# Patient Record
Sex: FEMALE | Race: BLACK OR AFRICAN AMERICAN | URBAN - METROPOLITAN AREA
[De-identification: names, ages, dates, MRNs, and addresses within clinical notes are randomized per-mention and may not be internally consistent; named-entity substitution may affect disease eponyms.]

---

## 2017-04-27 ENCOUNTER — EMERGENCY (EMERGENCY)
Facility: HOSPITAL | Age: 57
LOS: 1 days | Discharge: HOME | End: 2017-04-27
Admitting: EMERGENCY MEDICINE
Payer: COMMERCIAL

## 2017-04-27 VITALS
RESPIRATION RATE: 18 BRPM | SYSTOLIC BLOOD PRESSURE: 138 MMHG | TEMPERATURE: 98 F | OXYGEN SATURATION: 100 % | HEART RATE: 98 BPM | DIASTOLIC BLOOD PRESSURE: 83 MMHG

## 2017-04-27 LAB
ALBUMIN SERPL ELPH-MCNC: 4.2 G/DL — SIGNIFICANT CHANGE UP (ref 3.3–5)
ALP SERPL-CCNC: 101 U/L — SIGNIFICANT CHANGE UP (ref 40–120)
ALT FLD-CCNC: 21 U/L — SIGNIFICANT CHANGE UP (ref 4–33)
AST SERPL-CCNC: 17 U/L — SIGNIFICANT CHANGE UP (ref 4–32)
BASOPHILS # BLD AUTO: 0 K/UL — SIGNIFICANT CHANGE UP (ref 0–0.2)
BASOPHILS NFR BLD AUTO: 0 % — SIGNIFICANT CHANGE UP (ref 0–2)
BILIRUB SERPL-MCNC: 0.2 MG/DL — SIGNIFICANT CHANGE UP (ref 0.2–1.2)
BUN SERPL-MCNC: 10 MG/DL — SIGNIFICANT CHANGE UP (ref 7–23)
CALCIUM SERPL-MCNC: 9.9 MG/DL — SIGNIFICANT CHANGE UP (ref 8.4–10.5)
CHLORIDE SERPL-SCNC: 104 MMOL/L — SIGNIFICANT CHANGE UP (ref 98–107)
CK MB BLD-MCNC: 0.7 — SIGNIFICANT CHANGE UP (ref 0–2.5)
CK MB BLD-MCNC: 1.21 NG/ML — SIGNIFICANT CHANGE UP (ref 1–4.7)
CK SERPL-CCNC: 173 U/L — HIGH (ref 25–170)
CO2 SERPL-SCNC: 20 MMOL/L — LOW (ref 22–31)
CREAT SERPL-MCNC: 0.79 MG/DL — SIGNIFICANT CHANGE UP (ref 0.5–1.3)
EOSINOPHIL # BLD AUTO: 0.01 K/UL — SIGNIFICANT CHANGE UP (ref 0–0.5)
EOSINOPHIL NFR BLD AUTO: 0.1 % — SIGNIFICANT CHANGE UP (ref 0–6)
GLUCOSE SERPL-MCNC: 139 MG/DL — HIGH (ref 70–99)
HCT VFR BLD CALC: 43.7 % — SIGNIFICANT CHANGE UP (ref 34.5–45)
HGB BLD-MCNC: 14.8 G/DL — SIGNIFICANT CHANGE UP (ref 11.5–15.5)
IMM GRANULOCYTES NFR BLD AUTO: 0.5 % — SIGNIFICANT CHANGE UP (ref 0–1.5)
LYMPHOCYTES # BLD AUTO: 1.56 K/UL — SIGNIFICANT CHANGE UP (ref 1–3.3)
LYMPHOCYTES # BLD AUTO: 13 % — SIGNIFICANT CHANGE UP (ref 13–44)
MCHC RBC-ENTMCNC: 28.5 PG — SIGNIFICANT CHANGE UP (ref 27–34)
MCHC RBC-ENTMCNC: 33.9 % — SIGNIFICANT CHANGE UP (ref 32–36)
MCV RBC AUTO: 84 FL — SIGNIFICANT CHANGE UP (ref 80–100)
MONOCYTES # BLD AUTO: 0.21 K/UL — SIGNIFICANT CHANGE UP (ref 0–0.9)
MONOCYTES NFR BLD AUTO: 1.8 % — LOW (ref 2–14)
NEUTROPHILS # BLD AUTO: 10.12 K/UL — HIGH (ref 1.8–7.4)
NEUTROPHILS NFR BLD AUTO: 84.6 % — HIGH (ref 43–77)
PLATELET # BLD AUTO: 383 K/UL — SIGNIFICANT CHANGE UP (ref 150–400)
PMV BLD: 9.7 FL — SIGNIFICANT CHANGE UP (ref 7–13)
POTASSIUM SERPL-MCNC: 4.6 MMOL/L — SIGNIFICANT CHANGE UP (ref 3.5–5.3)
POTASSIUM SERPL-SCNC: 4.6 MMOL/L — SIGNIFICANT CHANGE UP (ref 3.5–5.3)
PROT SERPL-MCNC: 8 G/DL — SIGNIFICANT CHANGE UP (ref 6–8.3)
RBC # BLD: 5.2 M/UL — SIGNIFICANT CHANGE UP (ref 3.8–5.2)
RBC # FLD: 14.1 % — SIGNIFICANT CHANGE UP (ref 10.3–14.5)
SODIUM SERPL-SCNC: 139 MMOL/L — SIGNIFICANT CHANGE UP (ref 135–145)
TROPONIN T SERPL-MCNC: < 0.06 NG/ML — SIGNIFICANT CHANGE UP (ref 0–0.06)
WBC # BLD: 11.96 K/UL — HIGH (ref 3.8–10.5)
WBC # FLD AUTO: 11.96 K/UL — HIGH (ref 3.8–10.5)

## 2017-04-27 PROCEDURE — 99284 EMERGENCY DEPT VISIT MOD MDM: CPT

## 2017-04-27 PROCEDURE — 71020: CPT | Mod: 26

## 2017-04-27 RX ORDER — ACETAMINOPHEN 500 MG
650 TABLET ORAL ONCE
Qty: 0 | Refills: 0 | Status: COMPLETED | OUTPATIENT
Start: 2017-04-27 | End: 2017-04-27

## 2017-04-27 RX ADMIN — Medication 650 MILLIGRAM(S): at 22:43
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
## 2017-04-27 NOTE — ED PROVIDER NOTE - PROGRESS NOTE DETAILS
AMIE Orozco- Pt feeling better after ER stay, EKG NSR, no st t wave changes, trop neg, cxr neg for acute pathology. stable for dc The scribe's documentation has been prepared under my direction and personally reviewed by me in its entirety. I confirm that the note above accurately reflects all work, treatment, procedures, and medical decision making performed by me. Shaw Orozco PA-C
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
## 2017-04-27 NOTE — ED PROVIDER NOTE - NS ED MD SCRIBE ATTENDING SCRIBE SECTIONS
VITAL SIGNS( Pullset)/DISPOSITION/PHYSICAL EXAM/PAST MEDICAL/SURGICAL/SOCIAL HISTORY/HIV/REVIEW OF SYSTEMS/HISTORY OF PRESENT ILLNESS

## 2017-04-27 NOTE — ED PROVIDER NOTE - CHPI ED SYMPTOMS NEG
no loss of consciousness/no CP, no abd pain, no numbness, no tingling, no weakness, no other complaints
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
## 2017-04-30 ENCOUNTER — APPOINTMENT (OUTPATIENT)
Dept: CT IMAGING | Facility: IMAGING CENTER | Age: 57
End: 2017-04-30

## 2017-04-30 ENCOUNTER — OUTPATIENT (OUTPATIENT)
Dept: OUTPATIENT SERVICES | Facility: HOSPITAL | Age: 57
LOS: 1 days | End: 2017-04-30
Payer: COMMERCIAL

## 2017-04-30 DIAGNOSIS — Z00.8 ENCOUNTER FOR OTHER GENERAL EXAMINATION: ICD-10-CM

## 2017-04-30 PROCEDURE — 74177 CT ABD & PELVIS W/CONTRAST: CPT

## 2017-05-28 ENCOUNTER — EMERGENCY (EMERGENCY)
Facility: HOSPITAL | Age: 57
LOS: 1 days | Discharge: HOME | End: 2017-05-28
Attending: EMERGENCY MEDICINE
Payer: COMMERCIAL

## 2017-05-28 VITALS
TEMPERATURE: 99 F | HEART RATE: 95 BPM | SYSTOLIC BLOOD PRESSURE: 127 MMHG | RESPIRATION RATE: 18 BRPM | WEIGHT: 197.98 LBS | OXYGEN SATURATION: 95 % | HEIGHT: 64 IN | DIASTOLIC BLOOD PRESSURE: 65 MMHG

## 2017-05-28 DIAGNOSIS — J40 BRONCHITIS, NOT SPECIFIED AS ACUTE OR CHRONIC: ICD-10-CM

## 2017-05-28 DIAGNOSIS — K21.9 GASTRO-ESOPHAGEAL REFLUX DISEASE WITHOUT ESOPHAGITIS: ICD-10-CM

## 2017-05-28 DIAGNOSIS — J34.89 OTHER SPECIFIED DISORDERS OF NOSE AND NASAL SINUSES: ICD-10-CM

## 2017-05-28 DIAGNOSIS — R05 COUGH: ICD-10-CM

## 2017-05-28 DIAGNOSIS — R06.00 DYSPNEA, UNSPECIFIED: ICD-10-CM

## 2017-05-28 PROCEDURE — 99284 EMERGENCY DEPT VISIT MOD MDM: CPT | Mod: 25

## 2017-05-28 PROCEDURE — 71020: CPT | Mod: 26

## 2017-05-28 RX ORDER — PANTOPRAZOLE SODIUM 20 MG/1
0 TABLET, DELAYED RELEASE ORAL
Qty: 0 | Refills: 0 | COMMUNITY

## 2017-05-28 NOTE — ED ADULT TRIAGE NOTE - CHIEF COMPLAINT QUOTE
Patient reports "coughing and difficultly breathing." productive cough "white phlegm." + nasal congestion. Patient states symptoms "for 3 weeks but getting worse." Patient reports "coughing and difficultly breathing." productive cough "white phlegm." + nasal congestion. Patient states symptoms "for 3 weeks but getting worse." Patient given Duoneb treatment.

## 2017-05-28 NOTE — ED ADULT NURSE NOTE - OBJECTIVE STATEMENT
pt is AxOx4; c/o "coughing and difficultly breathing." productive cough "white phlegm." + nasal congestion. Patient states symptoms "for 3 weeks but getting worse." pt was seen by ENT 3 weeks ago for sinusitis and were tx with Z-pack. states that all "infection went down" to her chest. c/o  generalized body pain from continuous coughing

## 2017-05-28 NOTE — ED ADULT NURSE NOTE - CHIEF COMPLAINT QUOTE
Patient reports "coughing and difficultly breathing." productive cough "white phlegm." + nasal congestion. Patient states symptoms "for 3 weeks but getting worse."

## 2017-05-29 VITALS
OXYGEN SATURATION: 98 % | HEART RATE: 100 BPM | DIASTOLIC BLOOD PRESSURE: 49 MMHG | TEMPERATURE: 98 F | SYSTOLIC BLOOD PRESSURE: 108 MMHG | RESPIRATION RATE: 18 BRPM

## 2017-05-29 RX ORDER — IPRATROPIUM/ALBUTEROL SULFATE 18-103MCG
3 AEROSOL WITH ADAPTER (GRAM) INHALATION
Qty: 0 | Refills: 0 | Status: COMPLETED | OUTPATIENT
Start: 2017-05-29 | End: 2017-05-29

## 2017-05-29 RX ADMIN — Medication 3 MILLILITER(S): at 00:30

## 2017-05-29 RX ADMIN — Medication 3 MILLILITER(S): at 00:01

## 2017-05-29 RX ADMIN — Medication 100 MILLIGRAM(S): at 00:35

## 2017-05-29 RX ADMIN — Medication 50 MILLIGRAM(S): at 01:43

## 2017-05-29 RX ADMIN — Medication 3 MILLILITER(S): at 00:15

## 2017-05-29 NOTE — ED PROVIDER NOTE - PROGRESS NOTE DETAILS
Pt is improved after breathing treatment, CXR wnl per my read. Pt given steroids, d/c to f/u w pmd. Breathing comfortably, not in discomfort.

## 2017-05-29 NOTE — ED PROVIDER NOTE - OBJECTIVE STATEMENT
Pt is a 55 yo lady with no significant past medical history who presents to the ED with cough for 3 weeks. It started out with rhinorrhea, and sore throat which resolved, but then still had cough. Productive of white sputum, has no fevers, no chest pain, no sob. Has some post sussive emesis. No abdominal pain, no recent travel, no hx of dvt/pe, no pleuritic pain. No sick contacts. Has had bronchitis before, which this feels similar to.

## 2017-06-07 ENCOUNTER — APPOINTMENT (OUTPATIENT)
Dept: PEDIATRIC ALLERGY IMMUNOLOGY | Facility: CLINIC | Age: 57
End: 2017-06-07

## 2017-06-07 VITALS
BODY MASS INDEX: 34.06 KG/M2 | DIASTOLIC BLOOD PRESSURE: 86 MMHG | WEIGHT: 198.39 LBS | HEART RATE: 93 BPM | SYSTOLIC BLOOD PRESSURE: 125 MMHG

## 2017-07-19 ENCOUNTER — NON-APPOINTMENT (OUTPATIENT)
Age: 57
End: 2017-07-19

## 2017-07-19 ENCOUNTER — APPOINTMENT (OUTPATIENT)
Dept: PEDIATRIC ALLERGY IMMUNOLOGY | Facility: CLINIC | Age: 57
End: 2017-07-19

## 2017-07-19 VITALS
HEART RATE: 95 BPM | DIASTOLIC BLOOD PRESSURE: 76 MMHG | BODY MASS INDEX: 34.19 KG/M2 | SYSTOLIC BLOOD PRESSURE: 119 MMHG | WEIGHT: 199.19 LBS

## 2017-07-19 RX ORDER — CYCLOBENZAPRINE HYDROCHLORIDE 10 MG/1
10 TABLET, FILM COATED ORAL
Qty: 90 | Refills: 0 | Status: DISCONTINUED | COMMUNITY
Start: 2017-05-02

## 2017-07-19 RX ORDER — MONTELUKAST 10 MG/1
10 TABLET, FILM COATED ORAL
Qty: 1 | Refills: 1 | Status: DISCONTINUED | COMMUNITY
Start: 2017-06-07 | End: 2017-07-19

## 2017-07-19 RX ORDER — AZITHROMYCIN 250 MG/1
250 TABLET, FILM COATED ORAL
Qty: 6 | Refills: 0 | Status: DISCONTINUED | COMMUNITY
Start: 2017-04-26

## 2017-07-19 RX ORDER — BACLOFEN 10 MG/1
10 TABLET ORAL
Qty: 30 | Refills: 0 | Status: DISCONTINUED | COMMUNITY
Start: 2017-05-24

## 2017-08-01 ENCOUNTER — APPOINTMENT (OUTPATIENT)
Dept: PULMONOLOGY | Facility: CLINIC | Age: 57
End: 2017-08-01

## 2018-02-07 ENCOUNTER — EMERGENCY (EMERGENCY)
Facility: HOSPITAL | Age: 58
LOS: 1 days | Discharge: HOME | End: 2018-02-07
Admitting: EMERGENCY MEDICINE
Payer: COMMERCIAL

## 2018-02-07 VITALS
RESPIRATION RATE: 18 BRPM | HEART RATE: 109 BPM | TEMPERATURE: 101 F | DIASTOLIC BLOOD PRESSURE: 60 MMHG | OXYGEN SATURATION: 99 % | SYSTOLIC BLOOD PRESSURE: 129 MMHG

## 2018-02-07 PROCEDURE — 71046 X-RAY EXAM CHEST 2 VIEWS: CPT | Mod: 26

## 2018-02-07 PROCEDURE — 99284 EMERGENCY DEPT VISIT MOD MDM: CPT

## 2018-02-07 RX ORDER — IPRATROPIUM/ALBUTEROL SULFATE 18-103MCG
3 AEROSOL WITH ADAPTER (GRAM) INHALATION ONCE
Qty: 0 | Refills: 0 | Status: COMPLETED | OUTPATIENT
Start: 2018-02-07 | End: 2018-02-07

## 2018-02-07 RX ORDER — ACETAMINOPHEN 500 MG
650 TABLET ORAL ONCE
Qty: 0 | Refills: 0 | Status: COMPLETED | OUTPATIENT
Start: 2018-02-07 | End: 2018-02-07

## 2018-02-07 RX ADMIN — Medication 650 MILLIGRAM(S): at 20:27

## 2018-02-07 RX ADMIN — Medication 100 MILLIGRAM(S): at 21:37

## 2018-02-07 RX ADMIN — Medication 650 MILLIGRAM(S): at 20:40

## 2018-02-07 RX ADMIN — Medication 3 MILLILITER(S): at 20:48

## 2018-02-07 NOTE — ED PROVIDER NOTE - OBJECTIVE STATEMENT
56 y/o female c/o cough, sob, low grade fever that started yesterday. States she has been around coworkers that are sick. She started taking mucinex dm yesterday which has not helped.

## 2019-06-03 NOTE — ED PROVIDER NOTE - CPE EDP MUSC NORM
Provider, MD   buPROPion (WELLBUTRIN XL) 150 MG extended release tablet Take 150 mg by mouth every morning   Yes Historical Provider, MD   Dulaglutide (TRULICITY SC) Inject 5.44 mg into the skin every 7 days    Yes Historical Provider, MD   loxapine (LOXITANE) 5 MG capsule Take 10 mg by mouth nightly    Yes Historical Provider, MD BEAR Complex-Biotin-FA (B-50 COMPLEX PO) Take by mouth 2 times daily   Yes Historical Provider, MD Durant Varnell 450 MG CAPS Take 450 mg by mouth daily   Yes Historical Provider, MD   Lactobacillus (PROBIOTIC ACIDOPHILUS PO) Take by mouth daily   Yes Historical Provider, MD   Turmeric 500 MG TABS Take 500 mg by mouth daily   Yes Historical Provider, MD   ibuprofen (ADVIL) 200 MG CAPS Take 1 capsule by mouth   Yes Historical Provider, MD   Pseudoephedrine-Guaifenesin (MUCINEX D PO) Take by mouth as needed    Yes Historical Provider, MD   PSEUDOEPHEDRINE-DM PO Take by mouth   Yes Historical Provider, MD   ondansetron (ZOFRAN) 4 MG tablet Take 4 mg by mouth every 8 hours as needed for Nausea or Vomiting   Yes Historical Provider, MD   propranolol (INDERAL) 20 MG tablet Take 40 mg by mouth 2 times daily 2 tablets as needed   Yes Historical Provider, MD   lidocaine (XYLOCAINE) 5 % ointment Apply topically as needed. 4/19/17  Yes MONIKA Grimm - CNP   sucralfate (CARAFATE) 1 GM tablet Take 1 g by mouth 2 times daily    Yes Historical Provider, MD   valACYclovir (VALTREX) 1 G tablet Take 1,000 mg by mouth 2 times daily As needed   Yes Historical Provider, MD bear complex vitamins capsule Take 1 capsule by mouth daily   Yes Historical Provider, MD   diphenhydrAMINE (BENADRYL) 25 MG capsule Take 25 mg by mouth as needed 1 OR 2 TABLETS  D/T MIGRAINE   Yes Historical Provider, MD   montelukast (SINGULAIR) 10 MG tablet Take 10 mg by mouth nightly.    Yes Historical Provider, MD   simvastatin (ZOCOR) 20 MG tablet Take 20 mg by mouth every morning (before breakfast)    Yes Historical Provider, MD ranitidine (ZANTAC) 300 MG tablet Take 300 mg by mouth nightly. Yes Historical Provider, MD   Acetaminophen (TYLENOL ARTHRITIS EXT RELIEF PO) Take 2 capsules by mouth 3 times daily    Yes Historical Provider, MD   vitamin E 400 UNIT capsule Take 400 Units by mouth daily. Yes Historical Provider, MD   ascorbic acid (VITAMIN C) 500 MG tablet Take 500 mg by mouth 2 times daily. Yes Historical Provider, MD   Lutein 40 MG CAPS Take 1 capsule by mouth daily    Yes Historical Provider, MD   Lycopene 10 MG CAPS Take 1 capsule by mouth daily. Yes Historical Provider, MD   Potassium 99 MG TABS Take 1 capsule by mouth daily. Yes Historical Provider, MD   metformin (GLUMETZA) 500 MG (MOD) ER tablet Take 1,000 mg by mouth 2 times daily (with meals). Yes Historical Provider, MD   CALCIUM-VITAMIN D PO Take 1 tablet by mouth daily 1200 mg/ 25mg   Yes Historical Provider, MD   losartan (COZAAR) 25 MG tablet Take 25 mg by mouth daily. Yes Historical Provider, MD   Multiple Vitamin (MULTIVITAMIN PO) Take 1 tablet by mouth once.    Yes Historical Provider, MD   fish oil-omega-3 fatty acids 1000 MG capsule Take 1 g by mouth 3 times daily    Yes Historical Provider, MD   Sertraline HCl (ZOLOFT PO) Take 200 mg by mouth nightly    Yes Historical Provider, MD    Scheduled Meds:  Continuous Infusions:  PRN Meds:.        Recent Laboratory Data-     Lab Results   Component Value Date    WBC 7.1 06/03/2019    HGB 13.3 06/03/2019    HCT 39.9 06/03/2019    MCV 93.4 06/03/2019     06/03/2019    LYMPHOPCT 36.8 06/03/2019    RBC 4.27 06/03/2019    MCH 31.1 06/03/2019    MCHC 33.3 06/03/2019    RDW 13.7 06/03/2019    NEUTOPHILPCT 52.8 06/03/2019    MONOPCT 8.4 06/03/2019    BASOPCT 0.4 06/03/2019    NEUTROABS 3.73 06/03/2019    LYMPHSABS 2.60 06/03/2019    MONOSABS 0.59 06/03/2019    EOSABS 0.09 06/03/2019    BASOSABS 0.03 06/03/2019       Lab Results   Component Value Date     02/18/2019    K 5.0 02/18/2019     02/18/2019    CO2 27 02/18/2019    BUN 12 02/18/2019    CREATININE 0.7 02/18/2019    GLUCOSE 104 (H) 02/18/2019    CALCIUM 9.4 02/18/2019    PROT 8.1 01/25/2019    LABALBU 4.8 01/25/2019    BILITOT 0.3 01/25/2019    ALKPHOS 47 01/25/2019    AST 26 01/25/2019    ALT 21 01/25/2019    LABGLOM >60 02/18/2019    GFRAA >60 02/18/2019         Lab Results   Component Value Date    IRON 85 01/25/2019    TIBC 325 01/25/2019    FERRITIN 89 01/25/2019          Ref. Range 3/17/2014 11:39 10/27/2014 11:18 4/27/2015 14:48 11/4/2015 15:08 5/4/2016 13:19   CA 15-3 Latest Ref Range: 0 - 31 U/mL 10 10 9 9 10           Radiology-  CT ABDOMEN AND PELVIS:  2/6/19  1. Tiny focus of air within the urinary bladder, possibly related to   recent instrumentation or fistula. A small amount of air has been   noted within the bladder dating back to CTs from 9/28/2014. There is   no definite focal bladder wall thickening. 2. Bilateral kidneys are normal in morphology and demonstrate   symmetric enhancement. ASSESSMENT/PLAN :  Henry Wan woman with DCIS involving the left breast, diagnosed in Nov 2010,status post excision with clear margins. She completed radiation to the left breast . Pt had positive ER and MO receptors. She was not a good candidate for Tamoxifen in the preventive setting because of severe bipolar disorder,and the fact that she has had in the past TIA and has been on aspirin and Persantine. So in order to prevent potential for exacerbation of depression or thromboembolic events, patient was simply recommended surveillance and she continues to do well at this time without any evidence of disease recurrence. She had follow up mammogram in OCT 2018 which was negative      2- Anemia By Hx diagnosed in [address] [facility] [address] 2013 likely related to post operative state with operative blood losses ,iron deficiency and myelosuppression by cellulitis and abdominal wall infection . She also had NSAID induced gastritis . She has been intolerant of oral Iron supplements with dyspepsia and constipation and was recommended parenteral IV iron . She responded well with normalization of her Hb and iron levels. To continue Protonix and Zantac. Had follow up EGD with Dr Junnie Lundborg in MAY with findings of healed ulcers . Her repeat CBC showed recurrent anemia with a hemoglobin of 11 and her iron studies are consistent with iron deficiency anemia. The fact that she is on Zantac and Pantoprazole with lack of stomach acidity, her oral iron absorption is impaired and she will be recommended parenteral IV iron with Feraheme.    Her anemia is likely related to GI blood losses from gastritis as well as colitis. Her last colonoscopy in 2016 had shown only colitis  She responded well to parenteral IV iron and her hemoglobin is up to normal range 12.1 on 5/16/18  It is slightly down to 11.1 In December 2018  She responded well to parenteral IV iron And her hemoglobin today is up to 13.3     3- Abdominal wall cellulitis resolved .      4-Mildly elevated transaminases chronic in nature and attributed to fatty liver secondary to diabetes  Most recent hepatic panel is back to normal range      Ultrasound of thyroid and neck was entirely negative   She has been diagnosed on CT scan with small amount of air in her urinary bladder with suspicion of a fistula  and she had a cystoscopy 2/25/19. .     Continue surveillance on a yearly basis.           Antoine E. Madlyn Gaucher, M.D., F.A.C.P.   Electronically signed 6/3/2019 at 1:29 PM normal...

## 2019-06-27 PROBLEM — K21.9 GASTRO-ESOPHAGEAL REFLUX DISEASE WITHOUT ESOPHAGITIS: Chronic | Status: ACTIVE | Noted: 2017-05-28

## 2019-07-15 ENCOUNTER — OUTPATIENT (OUTPATIENT)
Dept: OUTPATIENT SERVICES | Facility: HOSPITAL | Age: 59
LOS: 1 days | End: 2019-07-15
Payer: COMMERCIAL

## 2019-07-15 ENCOUNTER — APPOINTMENT (OUTPATIENT)
Dept: ULTRASOUND IMAGING | Facility: IMAGING CENTER | Age: 59
End: 2019-07-15
Payer: COMMERCIAL

## 2019-07-15 DIAGNOSIS — R10.2 PELVIC AND PERINEAL PAIN: ICD-10-CM

## 2019-07-15 PROCEDURE — 76856 US EXAM PELVIC COMPLETE: CPT

## 2019-07-15 PROCEDURE — 76830 TRANSVAGINAL US NON-OB: CPT

## 2019-07-15 PROCEDURE — 76830 TRANSVAGINAL US NON-OB: CPT | Mod: 26

## 2019-07-15 PROCEDURE — 76856 US EXAM PELVIC COMPLETE: CPT | Mod: 26

## 2019-07-25 ENCOUNTER — APPOINTMENT (OUTPATIENT)
Dept: ULTRASOUND IMAGING | Facility: IMAGING CENTER | Age: 59
End: 2019-07-25
Payer: COMMERCIAL

## 2019-07-25 ENCOUNTER — APPOINTMENT (OUTPATIENT)
Dept: MAMMOGRAPHY | Facility: IMAGING CENTER | Age: 59
End: 2019-07-25
Payer: COMMERCIAL

## 2019-07-25 ENCOUNTER — OUTPATIENT (OUTPATIENT)
Dept: OUTPATIENT SERVICES | Facility: HOSPITAL | Age: 59
LOS: 1 days | End: 2019-07-25
Payer: COMMERCIAL

## 2019-07-25 DIAGNOSIS — Z00.8 ENCOUNTER FOR OTHER GENERAL EXAMINATION: ICD-10-CM

## 2019-07-25 PROCEDURE — 77063 BREAST TOMOSYNTHESIS BI: CPT | Mod: 26

## 2019-07-25 PROCEDURE — 77067 SCR MAMMO BI INCL CAD: CPT | Mod: 26

## 2019-07-25 PROCEDURE — 77067 SCR MAMMO BI INCL CAD: CPT

## 2019-07-25 PROCEDURE — 76641 ULTRASOUND BREAST COMPLETE: CPT

## 2019-07-25 PROCEDURE — 76641 ULTRASOUND BREAST COMPLETE: CPT | Mod: 26,50

## 2019-07-25 PROCEDURE — 77063 BREAST TOMOSYNTHESIS BI: CPT

## 2019-08-01 ENCOUNTER — APPOINTMENT (OUTPATIENT)
Dept: ULTRASOUND IMAGING | Facility: IMAGING CENTER | Age: 59
End: 2019-08-01
Payer: COMMERCIAL

## 2019-08-01 ENCOUNTER — OUTPATIENT (OUTPATIENT)
Dept: OUTPATIENT SERVICES | Facility: HOSPITAL | Age: 59
LOS: 1 days | End: 2019-08-01
Payer: COMMERCIAL

## 2019-08-01 DIAGNOSIS — Z00.8 ENCOUNTER FOR OTHER GENERAL EXAMINATION: ICD-10-CM

## 2019-08-01 PROCEDURE — 76705 ECHO EXAM OF ABDOMEN: CPT

## 2019-08-01 PROCEDURE — 76705 ECHO EXAM OF ABDOMEN: CPT | Mod: 26,RT

## 2019-08-01 PROCEDURE — 76700 US EXAM ABDOM COMPLETE: CPT

## 2019-08-13 ENCOUNTER — OUTPATIENT (OUTPATIENT)
Dept: OUTPATIENT SERVICES | Facility: HOSPITAL | Age: 59
LOS: 1 days | End: 2019-08-13
Payer: COMMERCIAL

## 2019-08-13 ENCOUNTER — APPOINTMENT (OUTPATIENT)
Dept: MAMMOGRAPHY | Facility: IMAGING CENTER | Age: 59
End: 2019-08-13
Payer: COMMERCIAL

## 2019-08-13 ENCOUNTER — APPOINTMENT (OUTPATIENT)
Dept: ULTRASOUND IMAGING | Facility: IMAGING CENTER | Age: 59
End: 2019-08-13
Payer: COMMERCIAL

## 2019-08-13 DIAGNOSIS — R92.8 OTHER ABNORMAL AND INCONCLUSIVE FINDINGS ON DIAGNOSTIC IMAGING OF BREAST: ICD-10-CM

## 2019-08-13 PROCEDURE — G0279: CPT

## 2019-08-13 PROCEDURE — 77065 DX MAMMO INCL CAD UNI: CPT | Mod: 26,LT

## 2019-08-13 PROCEDURE — 77065 DX MAMMO INCL CAD UNI: CPT

## 2019-08-13 PROCEDURE — 76642 ULTRASOUND BREAST LIMITED: CPT | Mod: 26,LT

## 2019-08-13 PROCEDURE — 77061 BREAST TOMOSYNTHESIS UNI: CPT | Mod: 26

## 2019-08-13 PROCEDURE — 76642 ULTRASOUND BREAST LIMITED: CPT

## 2019-08-20 ENCOUNTER — APPOINTMENT (OUTPATIENT)
Dept: ULTRASOUND IMAGING | Facility: IMAGING CENTER | Age: 59
End: 2019-08-20
Payer: COMMERCIAL

## 2019-08-20 ENCOUNTER — RESULT REVIEW (OUTPATIENT)
Age: 59
End: 2019-08-20

## 2019-08-20 ENCOUNTER — OUTPATIENT (OUTPATIENT)
Dept: OUTPATIENT SERVICES | Facility: HOSPITAL | Age: 59
LOS: 1 days | End: 2019-08-20
Payer: COMMERCIAL

## 2019-08-20 DIAGNOSIS — Z00.8 ENCOUNTER FOR OTHER GENERAL EXAMINATION: ICD-10-CM

## 2019-08-20 PROCEDURE — 77065 DX MAMMO INCL CAD UNI: CPT

## 2019-08-20 PROCEDURE — 88342 IMHCHEM/IMCYTCHM 1ST ANTB: CPT | Mod: 26

## 2019-08-20 PROCEDURE — A4648: CPT

## 2019-08-20 PROCEDURE — 19083 BX BREAST 1ST LESION US IMAG: CPT

## 2019-08-20 PROCEDURE — 88341 IMHCHEM/IMCYTCHM EA ADD ANTB: CPT | Mod: 26

## 2019-08-20 PROCEDURE — 88341 IMHCHEM/IMCYTCHM EA ADD ANTB: CPT

## 2019-08-20 PROCEDURE — 77065 DX MAMMO INCL CAD UNI: CPT | Mod: 26,LT

## 2019-08-20 PROCEDURE — 88305 TISSUE EXAM BY PATHOLOGIST: CPT | Mod: 26

## 2019-08-20 PROCEDURE — 19083 BX BREAST 1ST LESION US IMAG: CPT | Mod: LT

## 2019-08-20 PROCEDURE — 88305 TISSUE EXAM BY PATHOLOGIST: CPT

## 2019-08-22 LAB — SURGICAL PATHOLOGY STUDY: SIGNIFICANT CHANGE UP

## 2019-11-14 NOTE — ED ADULT TRIAGE NOTE - MODE OF ARRIVAL
Immunization chart prep completed.  Immunization records verified.  Jessie Dsouza due for All Vacinations Up To Date No Vaccinations Needed   EMS

## 2020-04-25 ENCOUNTER — MESSAGE (OUTPATIENT)
Age: 60
End: 2020-04-25

## 2020-05-14 ENCOUNTER — APPOINTMENT (OUTPATIENT)
Dept: DISASTER EMERGENCY | Facility: CLINIC | Age: 60
End: 2020-05-14

## 2020-07-23 ENCOUNTER — TRANSCRIPTION ENCOUNTER (OUTPATIENT)
Age: 60
End: 2020-07-23

## 2020-11-04 ENCOUNTER — APPOINTMENT (OUTPATIENT)
Dept: ULTRASOUND IMAGING | Facility: IMAGING CENTER | Age: 60
End: 2020-11-04
Payer: COMMERCIAL

## 2020-11-04 ENCOUNTER — OUTPATIENT (OUTPATIENT)
Dept: OUTPATIENT SERVICES | Facility: HOSPITAL | Age: 60
LOS: 1 days | End: 2020-11-04
Payer: COMMERCIAL

## 2020-11-04 DIAGNOSIS — Z00.8 ENCOUNTER FOR OTHER GENERAL EXAMINATION: ICD-10-CM

## 2020-11-04 PROCEDURE — 76536 US EXAM OF HEAD AND NECK: CPT

## 2020-11-04 PROCEDURE — 76536 US EXAM OF HEAD AND NECK: CPT | Mod: 26

## 2020-11-05 NOTE — ED ADULT TRIAGE NOTE - WEIGHT IN LBS
Bedside and Verbal shift change report given to Margarette Ferrell RN (oncoming nurse) by Catrina LAM, RN  (offgoing nurse). Report included the following information SBAR, Kardex and MAR. SHIFT UPDATES:  Patient presents with blindness and hard of hearing and presented with a diagnosis of severe sepsis. As of 11/5/2020 at 945 am, patient had a lactic acid level of 1.7 and also received IV meropenem as antibiotic therapy for sepsis management. Additionally, patient still presented with blood via her nephrostomy tube in which was brought to the attention of medical staff during Interdisciplinary Rounds. No additional orders were given in accordance to any interventions for bleeding from nephrostomy tube. Patient requires nursing staff to turn/reposition every 2 hours and to assist with feeding for meal periods. Additionally, Dr. Pierre Thomson was notified of critical labs verbalized by lab for patient: (For the first blood culture collected on 11/4 at 1810 pm (Anaerobic sample: Gram negative rods) & (Aerobic sample: Gram positive cocci). For the second blood culture collected on 11/4 at 1930 pm (Anaerboic & Aerobic Samples: Gram negative Rods). Dr. Pierre Thomson provided no new orders for critical labs. Patient presents on contact precautions for ESBL in urine and C diff in colostomy. Patient's son Manolo Whitfield was updated by nursing staff during shift of patient's plan of care. During IDRs, medical staff was informed that patient's son wanted to speak with MD staff and understanding was verbalized. ABNORMAL LAB:   Results for Lindy Beauchamp (MRN [id number]) as of 11/5/2020 16:41   Ref.  Range 11/5/2020 05:29   WBC Latest Ref Range: 4.6 - 13.2 K/uL 14.7 (H)   RBC Latest Ref Range: 4.20 - 5.30 M/uL 3.31 (L)   HGB Latest Ref Range: 12.0 - 16.0 g/dL 9.6 (L)   HCT Latest Ref Range: 35.0 - 45.0 % 29.2 (L)   MCV Latest Ref Range: 74.0 - 97.0 FL 88.2   MCH Latest Ref Range: 24.0 - 34.0 PG 29.0   MCHC Latest Ref Range: 31.0 - 37.0 g/dL 32.9   RDW Latest Ref Range: 11.6 - 14.5 % 14.5   PLATELET Latest Ref Range: 135 - 420 K/uL 281   MPV Latest Ref Range: 9.2 - 11.8 FL 8.9 (L)   Sodium Latest Ref Range: 136 - 145 mmol/L 133 (L)   Potassium Latest Ref Range: 3.5 - 5.5 mmol/L 5.2   Chloride Latest Ref Range: 100 - 111 mmol/L 102   CO2 Latest Ref Range: 21 - 32 mmol/L 20 (L)   Anion gap Latest Ref Range: 3.0 - 18 mmol/L 11   Glucose Latest Ref Range: 74 - 99 mg/dL 186 (H)   BUN Latest Ref Range: 7.0 - 18 MG/DL 44 (H)   Creatinine Latest Ref Range: 0.6 - 1.3 MG/DL 3.03 (H)   BUN/Creatinine ratio Latest Ref Range: 12 - 20   15   Calcium Latest Ref Range: 8.5 - 10.1 MG/DL 7.5 (L)   GFR est non-AA Latest Ref Range: >60 ml/min/1.73m2 15 (L)   GFR est AA Latest Ref Range: >60 ml/min/1.73m2 18 (L)   Bilirubin, total Latest Ref Range: 0.2 - 1.0 MG/DL 0.6   Protein, total Latest Ref Range: 6.4 - 8.2 g/dL 6.1 (L)   Albumin Latest Ref Range: 3.4 - 5.0 g/dL 2.2 (L)   Globulin Latest Ref Range: 2.0 - 4.0 g/dL 3.9   A-G Ratio Latest Ref Range: 0.8 - 1.7   0.6 (L)   ALT Latest Ref Range: 13 - 56 U/L 27   AST Latest Ref Range: 10 - 38 U/L 20   Alk. phosphatase Latest Ref Range: 45 - 117 U/L 115     Wounds? Decubitus Sacral Wound Stage II-Mepliex dressing in place. Central Lines? Right Upper Arm      Last BM:  Has Colostomy (ON enteric precautions due to C Diff). Pending Labs for AM Draw:  CBC with diff, BMP & Vancomycin Random at 4 am on 11/6/2020      Discharge plan:   As of 11/5/2020 case management note at 10:29 am, patient will discharge back to [address] [place] [address] upon being medically stable.      Reginaldo Little  11/5/2020   6:54 PM 197.9

## 2021-01-08 ENCOUNTER — APPOINTMENT (OUTPATIENT)
Dept: ORTHOPEDIC SURGERY | Facility: CLINIC | Age: 61
End: 2021-01-08
Payer: COMMERCIAL

## 2021-01-08 VITALS — HEIGHT: 64 IN | BODY MASS INDEX: 34.15 KG/M2 | WEIGHT: 200 LBS

## 2021-01-08 DIAGNOSIS — M47.812 SPONDYLOSIS W/OUT MYELOPATHY OR RADICULOPATHY, CERVICAL REGION: ICD-10-CM

## 2021-01-08 DIAGNOSIS — M25.511 PAIN IN RIGHT SHOULDER: ICD-10-CM

## 2021-01-08 PROCEDURE — 72040 X-RAY EXAM NECK SPINE 2-3 VW: CPT

## 2021-01-08 PROCEDURE — 73030 X-RAY EXAM OF SHOULDER: CPT | Mod: RT

## 2021-01-08 PROCEDURE — 99072 ADDL SUPL MATRL&STAF TM PHE: CPT

## 2021-01-08 PROCEDURE — 99204 OFFICE O/P NEW MOD 45 MIN: CPT

## 2021-01-08 NOTE — PHYSICAL EXAM
[de-identified] : Examination of the right shoulder discloses tenderness to the superior region at the supraspinatus and infraspinatus interval.  Positive impingement sign. [de-identified] : X-rays taken today of the cervical spine disclose multilevel degenerative disc space narrowing.  X-rays of the right shoulder and AP lateral and axillary views disclose.  A subacromial osteophyte

## 2021-01-08 NOTE — HISTORY OF PRESENT ILLNESS
[Worsening] : worsening [___ yrs] : [unfilled] year(s) ago [8] : a maximum pain level of 8/10 [Intermit.] : ~He/She~ states the symptoms seem to be intermittent [Bending] : worsened by bending [Lifting] : worsened by lifting [de-identified] : Pt presents for initial evaluation with pain in her right shoulder, pt is right hand dominant, pt has taken Advil for pain with some relief, there is no known injury, pt has  occasional numbness tingling to the right upper extremity radiating to the fingers. Possible hx of cervicalgia. [de-identified] : certain movements, lying down [de-identified] : medication

## 2021-01-08 NOTE — DISCUSSION/SUMMARY
[de-identified] : The patient was  prescribed Diclofenac and referred to physical therapy if symptoms persist or worsen further work-up will be considered.  The patient may require cortisone injection on her return visit as well.  Reassessment in 3 weeks to check her progress

## 2021-02-24 ENCOUNTER — APPOINTMENT (OUTPATIENT)
Dept: ORTHOPEDIC SURGERY | Facility: CLINIC | Age: 61
End: 2021-02-24
Payer: COMMERCIAL

## 2021-02-24 VITALS
HEIGHT: 64 IN | SYSTOLIC BLOOD PRESSURE: 113 MMHG | HEART RATE: 90 BPM | BODY MASS INDEX: 34.31 KG/M2 | WEIGHT: 201 LBS | OXYGEN SATURATION: 96 % | DIASTOLIC BLOOD PRESSURE: 80 MMHG

## 2021-02-24 DIAGNOSIS — M25.562 PAIN IN LEFT KNEE: ICD-10-CM

## 2021-02-24 DIAGNOSIS — M17.12 UNILATERAL PRIMARY OSTEOARTHRITIS, LEFT KNEE: ICD-10-CM

## 2021-02-24 PROCEDURE — 73564 X-RAY EXAM KNEE 4 OR MORE: CPT | Mod: LT

## 2021-02-24 PROCEDURE — 20611 DRAIN/INJ JOINT/BURSA W/US: CPT | Mod: RT

## 2021-02-24 PROCEDURE — 99214 OFFICE O/P EST MOD 30 MIN: CPT | Mod: 25

## 2021-02-24 PROCEDURE — 99072 ADDL SUPL MATRL&STAF TM PHE: CPT

## 2021-02-24 NOTE — HISTORY OF PRESENT ILLNESS
[Worsening] : worsening [de-identified] : Pt returns for follow up for pain in her right shoulder and pt is right hand dominant, pt is attending physical therapy 2x a week  for approx 1 month now,and pt is taking diclofenac. Pt states she doesn't feel therapy is helping her.  pain level , lying down at night causing pt to not sleep.\par Pt is also having pain in her left knee pain level 10/10. 1 year of pain, no known injury, there is  buckling.

## 2021-02-24 NOTE — PHYSICAL EXAM
[de-identified] : Physical exam and of the right shoulder discloses tenderness to overhead motion at 160 degrees with positive impingement signs.\par \par Examination of the left knee reveals mild medial joint line tenderness, however functional range of motion is preserved.  No signs of instability no effusions [de-identified] : X-rays taken of the left knee and AP lateral skyline and open notch views disclose mild medial joint space narrowing otherwise nonspecific.

## 2021-02-24 NOTE — DISCUSSION/SUMMARY
[de-identified] : The patient was informed of her findings she was shown her x-rays and gone over the situation in detail.  She underwent cortisone injection to the right shoulder and will be referred to physical therapy to address her left knee symptoms.  She will continue diclofenac as well and will be reevaluated in 6 weeks to check her progress if she remains symptomatic.

## 2021-02-24 NOTE — PROCEDURE
[de-identified] : Under sterile technique the right shoulder was injected with Depo-Medrol 40 mg with lidocaine.\par \par A discussion took place with the patient regarding the procedure. General risks and benefits were reviewed.\par The subacromial region of the right shoulder was prepped to attain a sterile field. Ethyl Chloride spray was used as a topical anesthetic. \par A 22-gauge 1-1/2 inch needle was used to inject the medication.\par The procedure was performed utilizing ultrasound guided needle placement with the Sonosite linear transducer in order to visualize and confirm the location of the needle tip and intra-articular delivery of the medication in the exact location desired to achieve maximal benefit from the medication. The images were recorded and saved.\par The injection site was sterilely dressed and the patient tolerated the procedure well, without complications.\par The patient was given post injection instructions including rest, cool pack applications, and take NSAIDs or acetaminophen. The patient was advised that if there are worsening symptoms or any associated problems, to contact our office accordingly

## 2021-04-16 ENCOUNTER — RESULT REVIEW (OUTPATIENT)
Age: 61
End: 2021-04-16

## 2021-06-21 ENCOUNTER — APPOINTMENT (OUTPATIENT)
Dept: ORTHOPEDIC SURGERY | Facility: CLINIC | Age: 61
End: 2021-06-21
Payer: COMMERCIAL

## 2021-06-21 VITALS — HEART RATE: 92 BPM | BODY MASS INDEX: 31.24 KG/M2 | OXYGEN SATURATION: 97 % | HEIGHT: 64 IN | WEIGHT: 183 LBS

## 2021-06-21 DIAGNOSIS — M75.41 IMPINGEMENT SYNDROME OF RIGHT SHOULDER: ICD-10-CM

## 2021-06-21 PROCEDURE — 99213 OFFICE O/P EST LOW 20 MIN: CPT

## 2021-06-21 PROCEDURE — 99072 ADDL SUPL MATRL&STAF TM PHE: CPT

## 2021-06-21 NOTE — HISTORY OF PRESENT ILLNESS
[Worsening] : worsening [___ mths] : [unfilled] month(s) ago [0] : a minimum pain level of 0/10 [10] : a maximum pain level of 10/10 [Lifting] : worsened by lifting [Rest] : relieved by rest [None] : No relieving factors are noted [de-identified] : Pt returns for follow up of her right shoulder, pt is right hand dominant, pt is still having pain in her right shoulder, with lack of motion. Pt has attending physical therapy for  2+ months and she feels it was not that helpful. Cortisone injection 2/2021 to the right shoulder, somewhat helpful  Pt is not taking diclofenac.  [de-identified] : certain movements

## 2021-06-21 NOTE — PHYSICAL EXAM
[de-identified] : Physical examination significant restricted motion with forward flexion and lateral abduction limited to approximately 100 degrees with pain.  Positive impingement and rotator cuff signs

## 2021-06-21 NOTE — DISCUSSION/SUMMARY
[de-identified] : The patient was advised of her findings.  She is not interested in attending further physical therapy stating that it is not helped her situation and only hurts her.  Her motion appears to be worsening and she is clearly noted to have a degree of adhesive capsulitis as well as impingement.  Rotator cuff tear should be ruled out and therefore MRI assessment should be considered a medical necessity authorization for MRI right shoulder accordingly.  In the interim the patient will take diclofenac.  She was advised that she may be a candidate for arthroscopic intervention

## 2021-07-16 ENCOUNTER — APPOINTMENT (OUTPATIENT)
Dept: MRI IMAGING | Facility: CLINIC | Age: 61
End: 2021-07-16
Payer: COMMERCIAL

## 2021-07-16 ENCOUNTER — OUTPATIENT (OUTPATIENT)
Dept: OUTPATIENT SERVICES | Facility: HOSPITAL | Age: 61
LOS: 1 days | End: 2021-07-16
Payer: COMMERCIAL

## 2021-07-16 DIAGNOSIS — M75.01 ADHESIVE CAPSULITIS OF RIGHT SHOULDER: ICD-10-CM

## 2021-07-16 DIAGNOSIS — M75.41 IMPINGEMENT SYNDROME OF RIGHT SHOULDER: ICD-10-CM

## 2021-07-16 PROCEDURE — 73221 MRI JOINT UPR EXTREM W/O DYE: CPT

## 2021-07-16 PROCEDURE — 73221 MRI JOINT UPR EXTREM W/O DYE: CPT | Mod: 26,RT

## 2021-08-02 ENCOUNTER — APPOINTMENT (OUTPATIENT)
Dept: ORTHOPEDIC SURGERY | Facility: CLINIC | Age: 61
End: 2021-08-02
Payer: COMMERCIAL

## 2021-08-02 VITALS
DIASTOLIC BLOOD PRESSURE: 79 MMHG | WEIGHT: 178 LBS | HEART RATE: 88 BPM | BODY MASS INDEX: 30.39 KG/M2 | OXYGEN SATURATION: 88 % | SYSTOLIC BLOOD PRESSURE: 115 MMHG | HEIGHT: 64 IN

## 2021-08-02 PROCEDURE — 99213 OFFICE O/P EST LOW 20 MIN: CPT

## 2021-08-02 NOTE — DISCUSSION/SUMMARY
[de-identified] : Given the patient's MRI findings and lack of response from physical therapy cortisone injection and NSAID medication she was referred to an shoulder specialist for consideration of arthroscopic lysis of adhesions.

## 2021-08-02 NOTE — HISTORY OF PRESENT ILLNESS
[Worsening] : worsening [___ yrs] : [unfilled] year(s) ago [10] : a maximum pain level of 10/10 [Lifting] : worsened by lifting [de-identified] : Pt returns for follow up for pain in her right shoulder, pt is right hand dominant, Pt had MRI performed on 7/16/2021, pt is here for her results. Pt is not feeling any improvement.  [de-identified] : certain movement [de-identified] : tylenol

## 2021-08-02 NOTE — PHYSICAL EXAM
[de-identified] : Physical examination of the right shoulder discloses similar findings of restricted motion without any significant improvement. [de-identified] : Results of the right shoulder MRI were reviewed with the patient.  Although there is a small supraspinatus tear I believe the main issues related to this patient's condition are central to her adhesive capsulitis.

## 2021-08-12 ENCOUNTER — APPOINTMENT (OUTPATIENT)
Dept: ORTHOPEDIC SURGERY | Facility: CLINIC | Age: 61
End: 2021-08-12
Payer: COMMERCIAL

## 2021-08-12 VITALS — BODY MASS INDEX: 30.39 KG/M2 | WEIGHT: 178 LBS | HEIGHT: 64 IN

## 2021-08-12 DIAGNOSIS — M75.01 ADHESIVE CAPSULITIS OF RIGHT SHOULDER: ICD-10-CM

## 2021-08-12 PROCEDURE — 99214 OFFICE O/P EST MOD 30 MIN: CPT

## 2021-08-12 NOTE — HISTORY OF PRESENT ILLNESS
[de-identified] : 60 year old female presents today with right shoulder pain x 1 year. No injury reported. She has been under the care of Dr. Reynoso, received cortisone injection in February with helped with pain but not mobility. She completed 3-4 months of PT which was not helpful. Last session was in April. The pain is constant worse with sudden movements. She takes Tylenol as need. She has obtained MRI and as per Dr. Ryenoso patient has frozen shoulder and RTC tear referred here for further treatment. Denies numbness or tingling. \par \par The patient's past medical history, past surgical history, medications and allergies were reviewed by me today with the patient and documented accordingly. In addition, the patient's family and social history, which were noncontributory to this visit, were reviewed also.

## 2021-08-12 NOTE — DISCUSSION/SUMMARY
[de-identified] : 59 y/o female with right shoulder adhesive capsulitis. \par \par The patient presents today with a painful gradual loss of active and passive glenohumeral motion. This is due to progressive fibrosis and contracture of the glenohumeral joint. This is referred to as adhesive capsulitis or "frozen shoulder". MRI shows capsular hyperintensity and thickening with pericapsular edema, findings which may be seen in adhesive capsulitis.  There is some tendinopathy of the rotator cuff without high-grade.  Patient was referred for possible surgical management, but given her progressive improvements, discussion was had with the patient regarding the final stage of the disease and potential improvements that can be made with physical therapy of surgical release.\par \par I discussed that this occurs in approximately 2-5% of the population, and can affects the contralateral shoulder in approximately 20-30% of patients. This can be due to a primary idiopathic condition, or because of a secondary underlying structural condition. I discussed the 4 stages of adhesive capsulitis. Stage I refers to an inflammatory condition that causes night pain/discomfort with associated full passive ROM. Stage II results in severe pain and restriction of motion during a hyper-inflammatory synovitis. Stage III results in profound tethered stiffness throughout range of motion without acute inflammatory changes. Stage IV results in stiffness with minimal residual pain and dysfunction.I discussed that the natural history of the condition is self-limiting however, this may take up to 12-24 months. Nonoperative treatment includes pharmacological treatment of the inflammation (including NSAID's, intra-articular corticosteroids) and aggressive physical therapy (if tolerated), surgery can address capsular contraction with release/manipulation under anesthesia.\par \par Recommendation: Begin trial of PT, Rx given. NSAIDs/Ice PRN. \par \par Follow up 3 months.

## 2021-08-12 NOTE — PHYSICAL EXAM
[de-identified] : Oriented to time, place, person\par Mood: Normal\par Affect: Normal\par Appearance: Healthy, well appearing, no acute distress.\par Gait: Normal\par Assistive Devices: None\par \par Right shoulder exam:\par \par Inspection: No malalignment, No defects, No atrophy\par Skin: No masses, No lesions\par Neck: Negative Spurling, full ROM, no pain with ROM\par AROM: FF to 70, abduction to 60, ER to 10, IR to lower lumbar, passive=active\par Painful arc ROM: Pain with further motion\par Tenderness: No bicipital tenderness, no tenderness to greater tuberosity/RTC insertion, no anterior shoulder/lesser tuberosity tenderness\par Strength: 5/5 ER, 5/5 IR in adduction, 5/5 supraspinatus testing, negative Curtis's test\par AC joint: No TTP/pain with cross arm testing\par Biceps: Speed Negative, Yergason Negative \par Impingement test: Negative Arana, Negative Neer\par Vasc: 2+ radial pulse \par Stability: Stable \par Neuro: AIN, PIN, Ulnar nerve intact to motor\par Sensation: Intact to light touch throughout  [de-identified] : Images were reviewed from [facility] dated 1.8.2021\par \par 3 views of right shoulder were obtained, that show no acute fracture or dislocation. There is no glenohumeral and no AC joint degenerative change seen. Type III acromion. There is no significant malalignment. No significant other obvious osseous abnormality, otherwise unremarkable. \par \par MRI right shoulder dated 7.16.2021 shows capsular hyperintensity and thickening with pericapsular edema, findings which may be seen in adhesive capsulitis. Tendinosis of supraspinatus tendon with low-grade, thin interstitial tear.

## 2021-08-12 NOTE — ADDENDUM
[FreeTextEntry1] : This note was written by Mackenzie Brown on 08/12/2021 acting solely as a scribe for Dr. Tomy Araujo.\par \par All medical record entries made by the Scribe were at my, Dr. Tomy Araujo, direction and personally dictated by me on 08/12/2021. I have personally reviewed the chart and agree that the record accurately reflects my personal performance of the history, physical exam, assessment and plan.

## 2022-03-02 ENCOUNTER — APPOINTMENT (OUTPATIENT)
Dept: OTOLARYNGOLOGY | Facility: CLINIC | Age: 62
End: 2022-03-02
Payer: COMMERCIAL

## 2022-03-02 VITALS
BODY MASS INDEX: 27.66 KG/M2 | SYSTOLIC BLOOD PRESSURE: 115 MMHG | DIASTOLIC BLOOD PRESSURE: 80 MMHG | WEIGHT: 162 LBS | HEIGHT: 64 IN | HEART RATE: 79 BPM

## 2022-03-02 DIAGNOSIS — Z80.42 FAMILY HISTORY OF MALIGNANT NEOPLASM OF PROSTATE: ICD-10-CM

## 2022-03-02 DIAGNOSIS — Z72.89 OTHER PROBLEMS RELATED TO LIFESTYLE: ICD-10-CM

## 2022-03-02 DIAGNOSIS — J31.0 CHRONIC RHINITIS: ICD-10-CM

## 2022-03-02 DIAGNOSIS — R19.8 OTHER SPECIFIED SYMPTOMS AND SIGNS INVOLVING THE DIGESTIVE SYSTEM AND ABDOMEN: ICD-10-CM

## 2022-03-02 DIAGNOSIS — K21.9 GASTRO-ESOPHAGEAL REFLUX DISEASE W/OUT ESOPHAGITIS: ICD-10-CM

## 2022-03-02 DIAGNOSIS — J45.909 UNSPECIFIED ASTHMA, UNCOMPLICATED: ICD-10-CM

## 2022-03-02 DIAGNOSIS — J45.991 COUGH VARIANT ASTHMA: ICD-10-CM

## 2022-03-02 PROCEDURE — 31575 DIAGNOSTIC LARYNGOSCOPY: CPT

## 2022-03-02 PROCEDURE — 99244 OFF/OP CNSLTJ NEW/EST MOD 40: CPT | Mod: 25

## 2022-03-02 RX ORDER — LIRAGLUTIDE 6 MG/ML
18 INJECTION, SOLUTION SUBCUTANEOUS
Qty: 45 | Refills: 0 | Status: COMPLETED | COMMUNITY
Start: 2021-04-19 | End: 2022-03-02

## 2022-03-02 RX ORDER — AZELASTINE HYDROCHLORIDE 137 UG/1
0.1 SPRAY, METERED NASAL TWICE DAILY
Qty: 1 | Refills: 5 | Status: COMPLETED | COMMUNITY
Start: 2017-06-07 | End: 2022-03-02

## 2022-03-02 RX ORDER — PEN NEEDLE, DIABETIC 32GX 5/32"
32G X 4 MM NEEDLE, DISPOSABLE MISCELLANEOUS
Qty: 100 | Refills: 0 | Status: COMPLETED | COMMUNITY
Start: 2021-02-08 | End: 2022-03-02

## 2022-03-02 RX ORDER — DICLOFENAC SODIUM 75 MG/1
75 TABLET, DELAYED RELEASE ORAL
Qty: 60 | Refills: 0 | Status: COMPLETED | COMMUNITY
Start: 2021-01-08 | End: 2022-03-02

## 2022-03-02 RX ORDER — TRIAMCINOLONE ACETONIDE 5 MG/G
0.5 CREAM TOPICAL
Qty: 60 | Refills: 0 | Status: COMPLETED | COMMUNITY
Start: 2021-01-20 | End: 2022-03-02

## 2022-03-02 RX ORDER — PANTOPRAZOLE 40 MG/1
40 TABLET, DELAYED RELEASE ORAL
Qty: 90 | Refills: 0 | Status: COMPLETED | COMMUNITY
Start: 2017-05-19 | End: 2022-03-02

## 2022-03-02 RX ORDER — HYDROXYZINE HYDROCHLORIDE 25 MG/1
25 TABLET ORAL
Qty: 90 | Refills: 0 | Status: COMPLETED | COMMUNITY
Start: 2021-01-20 | End: 2022-03-02

## 2022-03-02 RX ORDER — FLUTICASONE PROPIONATE 50 UG/1
50 SPRAY, METERED NASAL DAILY
Qty: 1 | Refills: 3 | Status: COMPLETED | COMMUNITY
Start: 2017-06-07 | End: 2022-03-02

## 2022-03-04 NOTE — PHYSICAL EXAM
[] : septum deviated to the right [Midline] : trachea located in midline position [Normal] : no rashes [FreeTextEntry1] : Got globus sensation right side of neck allergic rhinitis [de-identified] : Hypertrophy blue and boggy [de-identified] : 4+ Inferior pole of tonsil touching voice box

## 2022-03-04 NOTE — HISTORY OF PRESENT ILLNESS
[de-identified] : 61 year old female here for right sided globus sensation.  States has had the sensation of something in the right side of her throat for the past 3 years.   Denies dysphagia, odynophagia, dyspnea, dysphonia or otalgia.  Denies use of over the counter antacids or reflux medications.  No history of smoking or alcohol use.\par  [FreeTextEntry4] : Globus right side of neck

## 2022-03-04 NOTE — PROCEDURE
[Image(s) Captured] : image(s) captured and filed [Video Captured] : video captured and filed [Unable to Cooperate with Mirror] : patient unable to cooperate with mirror [Complicated Symptoms] : complicated symptoms requiring more thorough examination than provided by mirror [Globus] : globus [Topical Lidocaine] : topical lidocaine [Oxymetazoline HCl] : oxymetazoline HCl [Flexible Endoscope] : examined with the flexible endoscope [Serial Number: ___] : Serial Number: [unfilled] [Normal] : the false vocal folds were pink and regular, the ventricular sulcus was open, the true vocal folds were glistening white, tense and of equal length, mobility, and height [True Vocal Cords Paralysis] : no true vocal cord paralysis [True Vocal Cords Erythematous] : no true vocal cord edema [True Vocal Cords West's Nodules] : no true vocal cord nodules [Glottis Arytenoid Cartilages] : no arytenoid ulcerations [Glottis Arytenoid Cartilages Erythema] : no arytenoid erythema [Arytenoid Edema ___ /4] : bilaterally were normal

## 2022-03-04 NOTE — REASON FOR VISIT
[Initial Consultation] : an initial consultation for [FreeTextEntry2] : here for right sided globus sensation

## 2022-03-04 NOTE — CONSULT LETTER
[Dear  ___] : Dear  [unfilled], [Courtesy Letter:] : I had the pleasure of seeing your patient, [unfilled], in my office today. [Please see my note below.] : Please see my note below. [Sincerely,] : Sincerely, [Consult Closing:] : Thank you very much for allowing me to participate in the care of this patient.  If you have any questions, please do not hesitate to contact me. [FreeTextEntry3] : Eder York MD, DARWIN, FACS\par  Department Otolaryngology\par Director of [facility]\par Professor of Otolaryngology, \par [employer]/[facility] [employer] [facility] [employer]\par  [FreeTextEntry2] : Lilian Joiner-Jose\par

## 2022-03-22 ENCOUNTER — APPOINTMENT (OUTPATIENT)
Dept: MRI IMAGING | Facility: CLINIC | Age: 62
End: 2022-03-22
Payer: COMMERCIAL

## 2022-03-22 ENCOUNTER — APPOINTMENT (OUTPATIENT)
Dept: ULTRASOUND IMAGING | Facility: CLINIC | Age: 62
End: 2022-03-22
Payer: COMMERCIAL

## 2022-03-22 ENCOUNTER — APPOINTMENT (OUTPATIENT)
Dept: MAMMOGRAPHY | Facility: CLINIC | Age: 62
End: 2022-03-22
Payer: COMMERCIAL

## 2022-03-22 PROCEDURE — 70542 MRI ORBIT/FACE/NECK W/DYE: CPT

## 2022-03-22 PROCEDURE — 76641 ULTRASOUND BREAST COMPLETE: CPT | Mod: 50

## 2022-03-22 PROCEDURE — 77063 BREAST TOMOSYNTHESIS BI: CPT

## 2022-03-22 PROCEDURE — A9585: CPT

## 2022-03-22 PROCEDURE — 77067 SCR MAMMO BI INCL CAD: CPT

## 2022-05-22 ENCOUNTER — NON-APPOINTMENT (OUTPATIENT)
Age: 62
End: 2022-05-22

## 2022-05-28 NOTE — ED PROVIDER NOTE - NS HIV RISK FACTOR YES
Provisional Diagnosis:   Depression with suicidal ideations. Psychosocial and Contextual Factors: Pt is homeless. Pt has substance abuse issues. C-SSRS Summary:    Patient: X    Family:     Agency: X (EPIC)    Present Suicidal Behavior:     Verbal: X    Attempt:     Past Suicidal Behavior:     Verbal: X    Attempt:     Self- Injurious/ Self-Mutilation:  Pt denies    Trauma History: Pt denies    Protective Factors: Pt has insurance. Risk Factors: Pt has poor judgement and coping skills. Pt is not compliant with medications and outpatient follow up. Substance Abuse: Cocaine and fentanyl    Clinical Summary:  Chante Guerrier is a 32year old male who presents to the ED via a cab. Pt was seen at [facility] 2 hrs prior to arrival ad denied SI at that time. Pt is now stating pt is suicidal with a plan to stab self in the chest. Pt has had these thoughts for the past 4 days. Pt identifies pt's homelessness and being off pt's medications as the triggers to pt's SI. Pt denies HI/AH/VH. Pt has a previous diagnosis of schizoaffective disorder. Pt has been off pt's medication for the past \"few weeks. \" Pt did not follow up with Liz after pt was discharged from the [facility] on 4/8/22. Pt initially denied substance abuse to this writer but then later admits to using cocaine earlier today and fentanyl 2 weeks ago. Pt reports poor sleep and a poor appetite. Level of Care Disposition:.DEZ consulted with  from psychiatry. Pt accepted for an inpatient admission to the [facility] for safety and stabilization. Declined

## 2022-11-29 ENCOUNTER — APPOINTMENT (OUTPATIENT)
Dept: PEDIATRIC INFECTIOUS DISEASE | Facility: CLINIC | Age: 62
End: 2022-11-29
Payer: SELF-PAY

## 2022-11-29 VITALS — WEIGHT: 149.19 LBS | BODY MASS INDEX: 25.61 KG/M2 | TEMPERATURE: 98.06 F

## 2022-11-29 DIAGNOSIS — Z23 ENCOUNTER FOR IMMUNIZATION: ICD-10-CM

## 2022-11-29 DIAGNOSIS — Z71.84 ENC FOR HEALTH COUNSELING RELATED TO TRAVEL: ICD-10-CM

## 2022-11-29 PROCEDURE — 90717 YELLOW FEVER VACCINE SUBQ: CPT

## 2022-11-29 PROCEDURE — 99202 OFFICE O/P NEW SF 15 MIN: CPT

## 2022-11-29 NOTE — CONSULT LETTER
[Dear  ___] : Dear  [unfilled], [Courtesy Letter:] : I had the pleasure of seeing your patient, [unfilled], in my office today. [Please see my note below.] : Please see my note below. [Consult Closing:] : Thank you very much for allowing me to participate in the care of this patient.  If you have any questions, please do not hesitate to contact me. [Sincerely,] : Sincerely, [FreeTextEntry3] : Keven Montague MD \par Attending Physician, Infectious Diseases, [facility]\par Professor, [facility] [employer]/[facility] \par Pediatric ID, Pediatric & Adult Travel Medicine [address] [place] [address]  Tel: [phone]  Fax: [phone] \par Pediatric ID, Pediatric Travel Medicine [address], [place]  Tel: [phone]  Fax: [phone]\par \par

## 2022-11-29 NOTE — HISTORY OF PRESENT ILLNESS
[Initial Pre-Travel Evaluation] : an initial pre-travel visit [The Country(ies) of ___] : the country(ies) of [unfilled] [Rural Areas] : rural areas [Urban Areas] : urban areas [Travel Begins ___] : begins [unfilled] [Travel Duration ___] : will last [unfilled] [Tourism] : tourism [Hotel] : hotel [No Allergy To Latex] : no allergy to latex [No History of Convulsions] : no history of convulsions [No History of Depression] : no history of depression [No History of Cardiac Problems] : no history of cardiac problems [Not Currently Taking Steroids] : not currently taking steroids

## 2022-11-29 NOTE — ASSESSMENT
[FreeTextEntry1] : We explained insect bite prevention and food & water precautions in detail. Up to date on all TRAVEL -RELATED vaccines EXCEPT hep A.  \par She will obtain Vivotif and hep A (adult) and Shingrix at [facility].\par No yellow fever in the 2 countries, but wants it in case she goes elsewhere in [place] during the trip\par

## 2023-06-06 ENCOUNTER — NON-APPOINTMENT (OUTPATIENT)
Age: 63
End: 2023-06-06

## 2023-06-09 ENCOUNTER — APPOINTMENT (OUTPATIENT)
Dept: OTOLARYNGOLOGY | Facility: CLINIC | Age: 63
End: 2023-06-09
Payer: COMMERCIAL

## 2023-06-09 VITALS
WEIGHT: 144.5 LBS | RESPIRATION RATE: 18 BRPM | SYSTOLIC BLOOD PRESSURE: 119 MMHG | HEART RATE: 98 BPM | HEIGHT: 64 IN | BODY MASS INDEX: 24.67 KG/M2 | DIASTOLIC BLOOD PRESSURE: 82 MMHG | OXYGEN SATURATION: 100 % | TEMPERATURE: 208.76 F

## 2023-06-09 DIAGNOSIS — H61.899 OTHER SPECIFIED DISORDERS OF EXTERNAL EAR, UNSPECIFIED EAR: ICD-10-CM

## 2023-06-09 DIAGNOSIS — J03.90 ACUTE TONSILLITIS, UNSPECIFIED: ICD-10-CM

## 2023-06-09 PROCEDURE — 31575 DIAGNOSTIC LARYNGOSCOPY: CPT

## 2023-06-09 PROCEDURE — 99214 OFFICE O/P EST MOD 30 MIN: CPT | Mod: 25

## 2023-06-09 RX ORDER — ATOVAQUONE AND PROGUANIL HYDROCHLORIDE 250; 100 MG/1; MG/1
250-100 TABLET, FILM COATED ORAL DAILY
Qty: 28 | Refills: 0 | Status: COMPLETED | COMMUNITY
Start: 2022-11-29 | End: 2023-06-09

## 2023-06-09 RX ORDER — SALMONELLA TYPHI TY21A 6000000000 [CFU]/1
CAPSULE, COATED ORAL
Qty: 1 | Refills: 0 | Status: COMPLETED | COMMUNITY
Start: 2022-11-29 | End: 2023-06-09

## 2023-06-09 RX ORDER — VITAMIN A 10000 UNIT
TABLET ORAL
Refills: 0 | Status: ACTIVE | COMMUNITY

## 2023-06-09 RX ORDER — FLUOCINOLONE ACETONIDE 0.11 MG/ML
0.01 OIL AURICULAR (OTIC) DAILY
Qty: 1 | Refills: 2 | Status: ACTIVE | COMMUNITY
Start: 2023-06-09 | End: 1900-01-01

## 2023-06-09 RX ORDER — ZOSTER VACCINE RECOMBINANT, ADJUVANTED 50 MCG/0.5
50 KIT INTRAMUSCULAR
Qty: 0.5 | Refills: 0 | Status: COMPLETED | COMMUNITY
Start: 2022-11-29 | End: 2023-06-09

## 2023-06-09 RX ORDER — CHOLECALCIFEROL (VITAMIN D3) 25 MCG
TABLET ORAL
Refills: 0 | Status: ACTIVE | COMMUNITY

## 2023-06-09 RX ORDER — CALCIUM CARBONATE/VITAMIN D3 600 MG-10
TABLET ORAL
Refills: 0 | Status: ACTIVE | COMMUNITY

## 2023-06-09 RX ORDER — COLD-HOT PACK
EACH MISCELLANEOUS
Refills: 0 | Status: ACTIVE | COMMUNITY

## 2023-06-09 NOTE — HISTORY OF PRESENT ILLNESS
[de-identified] : 62 year old female presents with Right sided throat pain \par Last seen 03/02/22 for right sided globus sensation- revealed +4 Tonsils, started on Flonase.\par MRI Neck 03/22/22- IMPRESSION: Grossly unremarkable study.\par States constant right sided throat pain for 3 days, unsure if due to air quality \par Went to urgent Care 06/07/23- throat culture done- results pending. \par Reports one episode epistaxis last month, bright red in color \par Occasionally has blood tinge mucus when blowing nose. \par Unable to drink and eat due to pain- odynophagia\par Patient denies choking, dysphagia, dyspnea, dysphonia, neck swelling and fevers.

## 2023-06-09 NOTE — CONSULT LETTER
[Consult Letter:] : I had the pleasure of evaluating your patient, [unfilled]. [Please see my note below.] : Please see my note below. [Consult Closing:] : Thank you very much for allowing me to participate in the care of this patient.  If you have any questions, please do not hesitate to contact me. [Sincerely,] : Sincerely, [Dear  ___] : Dear  [unfilled], [FreeTextEntry2] : JIM MAYA MD  [FreeTextEntry3] : Eder York MD, DARWIN, FACS\par  Department Otolaryngology\par Director of [facility]\par Professor of Otolaryngology,\par [employer]/[facility] [employer]

## 2023-06-09 NOTE — PROCEDURE
[Unable to Cooperate with Mirror] : patient unable to cooperate with mirror [Complicated Symptoms] : complicated symptoms requiring more thorough examination than provided by mirror [Topical Lidocaine] : topical lidocaine [Oxymetazoline HCl] : oxymetazoline HCl [Flexible Endoscope] : examined with the flexible endoscope [Serial Number: ___] : Serial Number: [unfilled] [True Vocal Cords Paralysis] : no true vocal cord paralysis [True Vocal Cords Erythematous] : no true vocal cord edema [True Vocal Cords West's Nodules] : no true vocal cord nodules [Glottis Arytenoid Cartilages] : no arytenoid granulomas [Glottis Arytenoid Cartilages Erythema] : no arytenoid erythema [Normal] : posterior cricoid area had healthy pink mucosa in the interarytenoid area and the esophageal inlet [Present] : absent [Arytenoid Edema ___ /4] : bilaterally were normal [Arytenoid Erythema ___ /4] : bilaterally were normal [de-identified] : Patient was placed in the examination chair in a sitting position. The nose was decongested with oxymetazoline nasal solution. The airway was anesthetized with 4% Xylocaine.  The back of the throat was anesthetized with Cetacaine. Direct flexible/rigid video endoscopy was performed. Findings revealed: \par Pt has tonsillar hypertrophy, tonsil touching epiglottis and vocal cords. [de-identified] : tonsils protruding into airway

## 2023-06-09 NOTE — PHYSICAL EXAM
[] : septum deviated to the right [Midline] : trachea located in midline position [Normal] : no rashes [FreeTextEntry1] : R throat pain [de-identified] : dry ear canals [de-identified] : Hypertrophy blue and boggy [de-identified] : 4+ Inferior pole of tonsil touching voice box

## 2023-06-28 ENCOUNTER — APPOINTMENT (OUTPATIENT)
Dept: OTOLARYNGOLOGY | Facility: CLINIC | Age: 63
End: 2023-06-28

## 2023-06-30 ENCOUNTER — APPOINTMENT (OUTPATIENT)
Dept: RADIOLOGY | Facility: IMAGING CENTER | Age: 63
End: 2023-06-30
Payer: COMMERCIAL

## 2023-06-30 ENCOUNTER — APPOINTMENT (OUTPATIENT)
Dept: OTOLARYNGOLOGY | Facility: CLINIC | Age: 63
End: 2023-06-30
Payer: COMMERCIAL

## 2023-06-30 ENCOUNTER — OUTPATIENT (OUTPATIENT)
Dept: OUTPATIENT SERVICES | Facility: HOSPITAL | Age: 63
LOS: 1 days | End: 2023-06-30
Payer: COMMERCIAL

## 2023-06-30 VITALS
BODY MASS INDEX: 24.92 KG/M2 | HEART RATE: 87 BPM | WEIGHT: 146 LBS | DIASTOLIC BLOOD PRESSURE: 70 MMHG | SYSTOLIC BLOOD PRESSURE: 106 MMHG | HEIGHT: 64 IN

## 2023-06-30 DIAGNOSIS — J30.9 ALLERGIC RHINITIS, UNSPECIFIED: ICD-10-CM

## 2023-06-30 DIAGNOSIS — R09.82 POSTNASAL DRIP: ICD-10-CM

## 2023-06-30 DIAGNOSIS — J35.1 HYPERTROPHY OF TONSILS: ICD-10-CM

## 2023-06-30 DIAGNOSIS — R05.3 CHRONIC COUGH: ICD-10-CM

## 2023-06-30 PROCEDURE — 31575 DIAGNOSTIC LARYNGOSCOPY: CPT

## 2023-06-30 PROCEDURE — 99214 OFFICE O/P EST MOD 30 MIN: CPT | Mod: 25

## 2023-06-30 PROCEDURE — 71046 X-RAY EXAM CHEST 2 VIEWS: CPT

## 2023-06-30 PROCEDURE — 71046 X-RAY EXAM CHEST 2 VIEWS: CPT | Mod: 26

## 2023-06-30 RX ORDER — METHYLPREDNISOLONE 4 MG/1
4 TABLET ORAL
Qty: 1 | Refills: 0 | Status: COMPLETED | COMMUNITY
Start: 2023-06-09 | End: 2023-06-30

## 2023-06-30 RX ORDER — OMEPRAZOLE 40 MG/1
40 CAPSULE, DELAYED RELEASE ORAL TWICE DAILY
Qty: 60 | Refills: 5 | Status: COMPLETED | COMMUNITY
Start: 2022-03-30 | End: 2023-06-30

## 2023-06-30 RX ORDER — CLINDAMYCIN HYDROCHLORIDE 300 MG/1
300 CAPSULE ORAL EVERY 6 HOURS
Qty: 40 | Refills: 2 | Status: COMPLETED | COMMUNITY
Start: 2023-06-09 | End: 2023-06-30

## 2023-06-30 NOTE — PROCEDURE
[Image(s) Captured] : image(s) captured and filed [Video Captured] : video captured and filed [Topical Lidocaine] : topical lidocaine [Oxymetazoline HCl] : oxymetazoline HCl [Flexible Endoscope] : examined with the flexible endoscope [Serial Number: ___] : Serial Number: [unfilled] [Unable to Cooperate with Mirror] : patient unable to cooperate with mirror [Complicated Symptoms] : complicated symptoms requiring more thorough examination than provided by mirror [Present] : absent [Normal] : normal vallecula [de-identified] : Patient was placed in the examination chair in a sitting position. The nose was decongested with oxymetazoline nasal solution. The airway was anesthetized with 4% Xylocaine.  The back of the throat was anesthetized with Cetacaine. Direct flexible/rigid video endoscopy was performed. Findings revealed: \par Pt has secretions building up in nasopharynx but no infection observed, R tonsil resting on epiglottis. [FreeTextEntry3] : secretion buildup [de-identified] : R tonsil resting onto epiglottis

## 2023-06-30 NOTE — REASON FOR VISIT
[Subsequent Evaluation] : a subsequent evaluation for [FreeTextEntry2] : follow up for right sided throat pain

## 2023-06-30 NOTE — HISTORY OF PRESENT ILLNESS
[de-identified] : 62 year old female follow up for right sided throat pain.  States took the Clindamycin and Medrol Dose pack as prescribed.  States throat pain has resolved.   States 6/26/23 developed a dry cough.  Covid tests have been negative.

## 2023-06-30 NOTE — CONSULT LETTER
[Dear  ___] : Dear  [unfilled], [Courtesy Letter:] : I had the pleasure of seeing your patient, [unfilled], in my office today. [Please see my note below.] : Please see my note below. [Sincerely,] : Sincerely, [FreeTextEntry2] : Lilian Christianson [FreeTextEntry3] : Eder York MD, DARWIN, FACS\par  Department Otolaryngology\par Director of [facility]\par Professor of Otolaryngology, \par [employer]/[facility] [employer]\par

## 2023-06-30 NOTE — PHYSICAL EXAM
[] : septum deviated to the right [Midline] : trachea located in midline position [Normal] : no rashes [FreeTextEntry1] : R throat pain [de-identified] : Hypertrophy blue and boggy [de-identified] : 4+ Inferior pole of tonsil touching voice box

## 2024-02-20 RX ORDER — AZELASTINE HYDROCHLORIDE 137 UG/1
0.1 SPRAY, METERED NASAL TWICE DAILY
Qty: 1 | Refills: 6 | Status: ACTIVE | COMMUNITY
Start: 2023-06-30 | End: 1900-01-01

## 2024-02-20 RX ORDER — FLUTICASONE PROPIONATE 50 UG/1
50 SPRAY, METERED NASAL DAILY
Qty: 1 | Refills: 5 | Status: ACTIVE | COMMUNITY
Start: 2022-03-02 | End: 1900-01-01

## 2024-09-07 ENCOUNTER — NON-APPOINTMENT (OUTPATIENT)
Age: 64
End: 2024-09-07

## 2025-01-23 ENCOUNTER — OUTPATIENT (OUTPATIENT)
Dept: OUTPATIENT SERVICES | Facility: HOSPITAL | Age: 65
LOS: 1 days | End: 2025-01-23
Payer: COMMERCIAL

## 2025-01-23 ENCOUNTER — APPOINTMENT (OUTPATIENT)
Dept: RADIOLOGY | Facility: IMAGING CENTER | Age: 65
End: 2025-01-23
Payer: COMMERCIAL

## 2025-01-23 DIAGNOSIS — J20.9 ACUTE BRONCHITIS, UNSPECIFIED: ICD-10-CM

## 2025-01-23 PROCEDURE — 71046 X-RAY EXAM CHEST 2 VIEWS: CPT | Mod: 26

## 2025-01-23 PROCEDURE — 71046 X-RAY EXAM CHEST 2 VIEWS: CPT

## 2025-08-05 ENCOUNTER — APPOINTMENT (OUTPATIENT)
Dept: OTOLARYNGOLOGY | Facility: CLINIC | Age: 65
End: 2025-08-05

## 2025-08-15 ENCOUNTER — NON-APPOINTMENT (OUTPATIENT)
Age: 65
End: 2025-08-15

## 2025-08-15 ENCOUNTER — APPOINTMENT (OUTPATIENT)
Dept: OTOLARYNGOLOGY | Facility: CLINIC | Age: 65
End: 2025-08-15

## 2025-08-15 VITALS
DIASTOLIC BLOOD PRESSURE: 71 MMHG | BODY MASS INDEX: 24.75 KG/M2 | HEART RATE: 73 BPM | WEIGHT: 145 LBS | SYSTOLIC BLOOD PRESSURE: 105 MMHG | HEIGHT: 64 IN

## 2025-08-15 DIAGNOSIS — J30.9 ALLERGIC RHINITIS, UNSPECIFIED: ICD-10-CM

## 2025-08-15 DIAGNOSIS — J32.9 CHRONIC SINUSITIS, UNSPECIFIED: ICD-10-CM

## 2025-08-15 DIAGNOSIS — J31.0 CHRONIC RHINITIS: ICD-10-CM

## 2025-08-15 DIAGNOSIS — H61.899 OTHER SPECIFIED DISORDERS OF EXTERNAL EAR, UNSPECIFIED EAR: ICD-10-CM

## 2025-08-15 DIAGNOSIS — R09.82 POSTNASAL DRIP: ICD-10-CM

## 2025-08-15 DIAGNOSIS — J03.90 ACUTE TONSILLITIS, UNSPECIFIED: ICD-10-CM

## 2025-08-15 DIAGNOSIS — J45.909 UNSPECIFIED ASTHMA, UNCOMPLICATED: ICD-10-CM

## 2025-08-15 DIAGNOSIS — R09.A2 FOREIGN BODY SENSATION, THROAT: ICD-10-CM

## 2025-08-15 DIAGNOSIS — J35.1 HYPERTROPHY OF TONSILS: ICD-10-CM

## 2025-08-15 DIAGNOSIS — J45.991 COUGH VARIANT ASTHMA: ICD-10-CM

## 2025-08-15 DIAGNOSIS — R05.3 CHRONIC COUGH: ICD-10-CM

## 2025-08-15 DIAGNOSIS — K21.9 GASTRO-ESOPHAGEAL REFLUX DISEASE W/OUT ESOPHAGITIS: ICD-10-CM

## 2025-08-15 DIAGNOSIS — R06.2 WHEEZING: ICD-10-CM

## 2025-08-15 PROCEDURE — 99214 OFFICE O/P EST MOD 30 MIN: CPT | Mod: 25

## 2025-08-15 RX ORDER — FLUOCINOLONE ACETONIDE 0.11 MG/ML
0.01 OIL AURICULAR (OTIC) DAILY
Qty: 1 | Refills: 4 | Status: ACTIVE | COMMUNITY
Start: 2025-08-15 | End: 1900-01-01

## 2025-08-15 RX ORDER — ASCORBIC ACID 125 MG
TABLET,CHEWABLE ORAL
Refills: 0 | Status: ACTIVE | COMMUNITY

## 2025-08-15 RX ORDER — CIPROFLOXACIN 0.5 MG/.25ML
0.2 SOLUTION/ DROPS AURICULAR (OTIC) 4 TIMES DAILY
Qty: 20 | Refills: 2 | Status: ACTIVE | COMMUNITY
Start: 2025-08-15 | End: 1900-01-01

## 2025-08-15 RX ORDER — ASPIRIN 325 MG
TABLET, DELAYED RELEASE (ENTERIC COATED) ORAL
Refills: 0 | Status: ACTIVE | COMMUNITY